# Patient Record
Sex: MALE | Race: ASIAN | NOT HISPANIC OR LATINO | URBAN - METROPOLITAN AREA
[De-identification: names, ages, dates, MRNs, and addresses within clinical notes are randomized per-mention and may not be internally consistent; named-entity substitution may affect disease eponyms.]

---

## 2017-07-25 PROBLEM — 422587007 NAUSEA: Status: ACTIVE | Noted: 2017-07-25

## 2017-07-25 PROBLEM — 267024001 ABNORMAL WEIGHT LOSS: Status: ACTIVE | Noted: 2017-07-25

## 2017-08-28 PROBLEM — 196731005 GASTRODUODENITIS: Status: ACTIVE | Noted: 2017-08-28

## 2017-08-28 PROBLEM — 275978004 SCREENING FOR MALIGNANT NEOPLASM OF COLON: Status: ACTIVE | Noted: 2017-08-28

## 2021-09-08 ENCOUNTER — TELEPHONE ENCOUNTER (OUTPATIENT)
Dept: URBAN - METROPOLITAN AREA CLINIC 35 | Facility: CLINIC | Age: 36
End: 2021-09-08

## 2021-09-24 ENCOUNTER — LAB OUTSIDE AN ENCOUNTER (OUTPATIENT)
Dept: URBAN - METROPOLITAN AREA CLINIC 37 | Facility: CLINIC | Age: 36
End: 2021-09-24

## 2021-09-24 ENCOUNTER — OFFICE VISIT (OUTPATIENT)
Dept: URBAN - METROPOLITAN AREA CLINIC 37 | Facility: CLINIC | Age: 36
End: 2021-09-24

## 2021-09-24 VITALS
OXYGEN SATURATION: 97 % | DIASTOLIC BLOOD PRESSURE: 68 MMHG | SYSTOLIC BLOOD PRESSURE: 112 MMHG | HEART RATE: 84 BPM | WEIGHT: 170 LBS | HEIGHT: 68 IN | BODY MASS INDEX: 25.76 KG/M2

## 2021-09-24 PROBLEM — 79922009 EPIGASTRIC PAIN: Status: ACTIVE | Noted: 2017-07-25

## 2021-09-24 PROBLEM — 88111009 CHANGE IN BOWEL HABIT: Status: ACTIVE | Noted: 2017-07-25

## 2021-09-24 PROBLEM — 271834000: Status: ACTIVE | Noted: 2021-09-24

## 2021-09-24 PROBLEM — 429006005 FAMILY HISTORY OF MALIGNANT NEOPLASM OF GASTROINTESTINAL TRACT: Status: ACTIVE | Noted: 2017-07-25

## 2021-09-24 RX ORDER — SUCRALFATE 1 G
1 TABLET ON AN EMPTY STOMACH, 2 HOURS APART FROM OTHER MEDS TABLET ORAL TWICE A DAY
Qty: 60 | Refills: 0 | OUTPATIENT
Start: 2021-09-24

## 2021-09-24 RX ORDER — OMEPRAZOLE 40 MG/1
1 CAPSULE CAPSULE, DELAYED RELEASE ORAL QAM
Qty: 30 | Refills: 2 | OUTPATIENT
Start: 2021-09-24

## 2021-09-24 RX ORDER — OMEPRAZOLE 20 MG/1
AS DIRECTED TABLET, DELAYED RELEASE ORAL
Status: ACTIVE | COMMUNITY

## 2021-09-24 NOTE — HPI-MIGRATED HPI
Initial consultation : Patient is here for -> ER follow up for abdominal pain Patient went to ER at [facility] on 09/04/2021 due to RUQ abdominal pain Patient had US Abdomen done during admission as followed:  US Abdomen during admission: Normal liver. Nondistended gallbladder (post prandial). Rutledge's sign was reportedly positive; however, no GB wall thickening or pericholecystic fluid. Overal, imaging findings are unlikely to be associated with acute cholecystitis. Please correlate clinically to evaluate a posterior Rutledge's sign vs. a diffuse RUQ pain  Patient was discharged on the same day under stable condition.  Patient was prescribed with Omeprazole 20mg   Onset of sx: 1 months ago Location: RUQ pain  Current sx: he reports still having pain at RUQ abdomen. Recently after discharge from ER, pt developed discomfort in the stomach associated with acid reflux, nausea but denies vomiting. Also describes "something stuck" from epigastric area to mid chest.  Current BM: he said he started having more BM a day than usual, about 3 BMs a day with loose stool.  Medications tried: Omeprazole from ER with very mild relief Tests/evaluations done previously:  - Colonoscopy 07/27/17 showed the terminal ileum and entire colon appeared be normal, no evidence of colitis - EGD on 07/27/17 showed normal esophagus and chronic gastritis. No H. pylori or IM  *** Patient is our establish and supposed to follow up after her post-op appt, however, pt was not able keep her f/u since 2017;

## 2021-09-28 ENCOUNTER — TELEPHONE ENCOUNTER (OUTPATIENT)
Dept: URBAN - METROPOLITAN AREA CLINIC 35 | Facility: CLINIC | Age: 36
End: 2021-09-28

## 2021-09-28 ENCOUNTER — OFFICE VISIT (OUTPATIENT)
Dept: URBAN - METROPOLITAN AREA CLINIC 37 | Facility: CLINIC | Age: 36
End: 2021-09-28

## 2021-11-01 ENCOUNTER — OFFICE VISIT (OUTPATIENT)
Dept: URBAN - METROPOLITAN AREA MEDICAL CENTER 10 | Facility: MEDICAL CENTER | Age: 36
End: 2021-11-01

## 2021-11-29 ENCOUNTER — DASHBOARD ENCOUNTERS (OUTPATIENT)
Age: 36
End: 2021-11-29

## 2021-11-29 ENCOUNTER — OFFICE VISIT (OUTPATIENT)
Dept: URBAN - METROPOLITAN AREA CLINIC 37 | Facility: CLINIC | Age: 36
End: 2021-11-29

## 2021-11-29 VITALS
BODY MASS INDEX: 26.22 KG/M2 | HEART RATE: 81 BPM | WEIGHT: 173 LBS | OXYGEN SATURATION: 98 % | HEIGHT: 68 IN | SYSTOLIC BLOOD PRESSURE: 110 MMHG | DIASTOLIC BLOOD PRESSURE: 66 MMHG

## 2021-11-29 RX ORDER — OMEPRAZOLE 40 MG/1
1 CAPSULE CAPSULE, DELAYED RELEASE ORAL
Qty: 30 | Refills: 2 | OUTPATIENT
Start: 2021-11-29

## 2021-11-29 NOTE — HPI-MIGRATED HPI
Post-op OV : Patient -> denies any new changes in his/her health status since last OV.  Pt was prescribed with Omeprazole 40mg QAM and Carafate 1gm BID from last visit.  After the procedure, patient was advised to consider stopping Carafate and continue with Omeprazole as needed;   Post-op OV : Patient reports of current symptoms -> intermittent epigastric pain and belching has improved since last OV, however admits that some days there is still some slight abdomen discomfort He has stopped taking lot of coffee and feeling better He has soft BM daily;   Post-op OV : After EGD on -> 11/01/2021 due to epigastric pain and excessive belching, esophagus was normal with irregular GE junction, 42cm from incisors. Distal esophagus bxx showed increased intraepithelial lymphocytes and regenerative features suggestive of reflux. Erythematous mucosa were noted in the gastric body and antrum. Gastric bxx showed mild chronic gastritis and regenerative changes, negative for IM and H.pylori. Normal duodenum;   Post-op OV : Patient denies -> rectal bleeding, fever, nausea & vomiting since the procedure date;

## 2021-12-20 ENCOUNTER — OFFICE VISIT (OUTPATIENT)
Dept: URBAN - METROPOLITAN AREA MEDICAL CENTER 10 | Facility: MEDICAL CENTER | Age: 36
End: 2021-12-20

## 2022-01-04 ENCOUNTER — OFFICE VISIT (OUTPATIENT)
Dept: URBAN - METROPOLITAN AREA CLINIC 35 | Facility: CLINIC | Age: 37
End: 2022-01-04